# Patient Record
Sex: MALE | Race: WHITE | Employment: FULL TIME | URBAN - METROPOLITAN AREA
[De-identification: names, ages, dates, MRNs, and addresses within clinical notes are randomized per-mention and may not be internally consistent; named-entity substitution may affect disease eponyms.]

---

## 2017-01-16 PROBLEM — D50.9 MICROCYTIC ANEMIA: Status: ACTIVE | Noted: 2017-01-16

## 2017-02-08 ENCOUNTER — OFFICE VISIT (OUTPATIENT)
Dept: WOUND CARE | Facility: HOSPITAL | Age: 52
End: 2017-02-08
Attending: INTERNAL MEDICINE
Payer: COMMERCIAL

## 2017-02-08 DIAGNOSIS — L98.422 NON-PRESSURE CHRONIC ULCER OF BACK WITH FAT LAYER EXPOSED (HCC): Primary | ICD-10-CM

## 2017-02-08 DIAGNOSIS — C20 RECTAL CANCER (HCC): ICD-10-CM

## 2017-02-08 PROCEDURE — 99214 OFFICE O/P EST MOD 30 MIN: CPT

## 2017-02-08 PROCEDURE — 87205 SMEAR GRAM STAIN: CPT

## 2017-02-08 PROCEDURE — 87070 CULTURE OTHR SPECIMN AEROBIC: CPT

## 2017-02-08 PROCEDURE — 11042 DBRDMT SUBQ TIS 1ST 20SQCM/<: CPT

## 2017-02-09 NOTE — PROGRESS NOTES
Chief Complaint  This information was obtained from the patient  Patient here for an initial visit. Patient had surgery for APR with a flap on 10/7/2016 the incision has not healed completely.  Had surgery [address] [facility] Dr. Ry Biggs needed        Objective    Constitutional  Height/Length: 64 in (162.56 cm), Weight: 147 lbs (66.82 kgs), BMI: 25.2, Temperature: 97.2 °F (36.22 °C), Pulse: 92 bpm, Respiratory Rate: 16 breaths/min, Blood Pressure: 161/98 mmHg.      Integumentary (Hair, Ski 0.6 sq cm and a volume of 0.36 cubic cm;  General Notes:  Denies pain        Plan    Wound Orders:  Wound #1 Coccyx     Topicals:  Initial Anesthetic Order: Apply lidocaine to wound bed on all future wound center visits during preparation for physician exa

## 2017-02-15 ENCOUNTER — OFFICE VISIT (OUTPATIENT)
Dept: WOUND CARE | Facility: HOSPITAL | Age: 52
End: 2017-02-15
Attending: INTERNAL MEDICINE
Payer: COMMERCIAL

## 2017-02-15 DIAGNOSIS — L98.422 NON-PRESSURE CHRONIC ULCER OF BACK WITH FAT LAYER EXPOSED (HCC): Primary | ICD-10-CM

## 2017-02-15 DIAGNOSIS — C20 RECTAL CANCER (HCC): ICD-10-CM

## 2017-02-15 PROCEDURE — 11042 DBRDMT SUBQ TIS 1ST 20SQCM/<: CPT

## 2017-02-15 NOTE — PROGRESS NOTES
Chief Complaint  This information was obtained from the patient  here for follow up. doing well with dressing changes. Allergies  NKDA    HPI  This information was obtained from the patient  2/15/17: Doing ok - wound dimensions pretty much the same. 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

## 2017-02-20 PROBLEM — D69.6 THROMBOCYTOPENIA (HCC): Status: ACTIVE | Noted: 2017-02-20

## 2017-02-24 ENCOUNTER — OFFICE VISIT (OUTPATIENT)
Dept: OPHTHALMOLOGY | Facility: CLINIC | Age: 52
End: 2017-02-24

## 2017-02-24 DIAGNOSIS — H40.003 GLAUCOMA SUSPECT OF BOTH EYES: Primary | ICD-10-CM

## 2017-02-24 DIAGNOSIS — H52.13 MYOPIA WITH PRESBYOPIA OF BOTH EYES: ICD-10-CM

## 2017-02-24 DIAGNOSIS — H52.4 MYOPIA WITH PRESBYOPIA OF BOTH EYES: ICD-10-CM

## 2017-02-24 DIAGNOSIS — H01.00A BLEPHARITIS OF UPPER AND LOWER EYELIDS OF BOTH EYES, UNSPECIFIED TYPE: ICD-10-CM

## 2017-02-24 DIAGNOSIS — H01.00B BLEPHARITIS OF UPPER AND LOWER EYELIDS OF BOTH EYES, UNSPECIFIED TYPE: ICD-10-CM

## 2017-02-24 DIAGNOSIS — Z83.511 FAMILY HISTORY OF GLAUCOMA IN MATERNAL GRANDMOTHER: ICD-10-CM

## 2017-02-24 PROBLEM — H52.203 MYOPIA OF BOTH EYES WITH ASTIGMATISM AND PRESBYOPIA: Status: ACTIVE | Noted: 2017-02-24

## 2017-02-24 PROCEDURE — 92014 COMPRE OPH EXAM EST PT 1/>: CPT | Performed by: OPHTHALMOLOGY

## 2017-02-24 PROCEDURE — 92133 CPTRZD OPH DX IMG PST SGM ON: CPT | Performed by: OPHTHALMOLOGY

## 2017-02-24 PROCEDURE — 92015 DETERMINE REFRACTIVE STATE: CPT | Performed by: OPHTHALMOLOGY

## 2017-02-24 PROCEDURE — 92083 EXTENDED VISUAL FIELD XM: CPT | Performed by: OPHTHALMOLOGY

## 2017-02-24 RX ORDER — LATANOPROST 50 UG/ML
SOLUTION/ DROPS OPHTHALMIC
Qty: 1 BOTTLE | Refills: 11 | Status: SHIPPED | OUTPATIENT
Start: 2017-02-24 | End: 2017-11-21

## 2017-02-24 RX ORDER — ACETAMINOPHEN 325 MG/1
TABLET ORAL
COMMUNITY
End: 2017-11-21

## 2017-02-24 NOTE — PROGRESS NOTES
Kristyn Enriquez is a 46year old male. HPI:     HPI     EP. LDE: 319/15. 46year old male here for a complete eye exam and OCT. Patient states when he wakes in the morning, his eyes ar \"crusty\".  Patient also has a decrease in his near vision for the Prescriptions:  acetaminophen 325 MG Oral Tab Take by mouth.  Disp:  Rfl:    latanoprost 0.005 % Ophthalmic Solution Instill 1 drop by ophthalmic route every night into both eyes Disp: 1 Bottle Rfl: 11   hydrocodone-acetaminophen 7.5-325 MG Oral Tab Take 1- Macula Normal Normal    Vessels Normal Normal    Periphery Normal Normal            Refraction     Wearing Rx      Sphere Cylinder Axis   Right -6.50 +0.00 000   Left -6.25 +0.00 000       Type:  Single vision      Manifest Refraction      Sphere Cylind

## 2017-02-24 NOTE — PATIENT INSTRUCTIONS
Family history of glaucoma in maternal grandmother  Patient's maternal grandmother lost her sight due to glaucoma.          Glaucoma suspect of both eyes  Patient is a glaucoma suspect due to increase cup to disc ratio in both eyes (0.6 in both eyes) and fa condition.   Causes  Causes of blepharitis may include:  · Problems with the oil glands in the eyelid (meibomian glands)  · Dandruff of the scalp and eyebrows (seborrheic dermatitis)  · Acne rosacea (a skin condition that causes redness of the face, and oth advised. Your healthcare provider may refer you to an eye specialist (an optometrist or ophthalmologist) for further evaluation and treatment.   When to seek medical advice  Call your healthcare provider right away if any of these occur:  · Increase in redn is called a stye. Left untreated, it may become a chronic cyst. You may need surgery to remove it. Date Last Reviewed: 6/4/2015  © 6947-3542 The [address] [place] [address], [address] [place] [address]. All rights reserved.  This information is not optic nerve sends messages to the brain so you can see. There are two main kinds of glaucoma: \"open-angle\" and \"closed-angle. \"  Drainage area  The eye is always producing fluid. The eye's drainage areas may become clogged or blocked.  Too much fluid sta directed. Don't stop taking them—even if you have no symptoms. If you do, eye pressure can rise rapidly and damage your vision. If the medicines cause side effects, talk to your doctor.   Procedures to improve eye drainage  In some cases of glaucoma, other ophthalmologist (a medical doctor who specializes in eye care) is very important to follow your response to the medicines. Home care  · Take prescribed medicines exactly as directed.   · The eye needs certain vitamins and minerals for good health—especiall instructions.

## 2017-02-24 NOTE — ASSESSMENT & PLAN NOTE
Patient is a glaucoma suspect due to increase cup to disc ratio in both eyes (0.6 in both eyes) and family history of glaucoma in the maternal grandmother.   OCT completed today with results of mild thinning in each eye, which is slightly worse from last OC

## 2017-03-21 PROBLEM — Z00.00 HEALTHCARE MAINTENANCE: Status: ACTIVE | Noted: 2017-03-21

## 2017-03-21 PROCEDURE — 82378 CARCINOEMBRYONIC ANTIGEN: CPT | Performed by: PHYSICIAN ASSISTANT

## 2017-03-23 ENCOUNTER — TELEPHONE (OUTPATIENT)
Dept: OPHTHALMOLOGY | Facility: CLINIC | Age: 52
End: 2017-03-23

## 2017-03-23 NOTE — TELEPHONE ENCOUNTER
Pt. Is requesting to get a copy of his Rx for his eye glasses faxed to [facility] # [phone] and their phone # [phone].

## 2017-08-21 PROCEDURE — 82378 CARCINOEMBRYONIC ANTIGEN: CPT | Performed by: PHYSICIAN ASSISTANT

## 2017-11-16 PROCEDURE — 82378 CARCINOEMBRYONIC ANTIGEN: CPT | Performed by: PHYSICIAN ASSISTANT

## 2018-02-20 PROCEDURE — 82378 CARCINOEMBRYONIC ANTIGEN: CPT | Performed by: PHYSICIAN ASSISTANT

## 2018-02-28 PROCEDURE — 82607 VITAMIN B-12: CPT | Performed by: INTERNAL MEDICINE

## 2020-01-23 PROBLEM — H52.13 MYOPIA WITH PRESBYOPIA OF BOTH EYES: Status: RESOLVED | Noted: 2017-02-24 | Resolved: 2020-01-23

## 2020-01-23 PROBLEM — Z83.511 FAMILY HISTORY OF GLAUCOMA IN MATERNAL GRANDMOTHER: Status: RESOLVED | Noted: 2017-02-24 | Resolved: 2020-01-23

## 2020-01-23 PROBLEM — Z00.00 HEALTHCARE MAINTENANCE: Status: RESOLVED | Noted: 2017-03-21 | Resolved: 2020-01-23

## 2020-01-23 PROBLEM — D50.9 MICROCYTIC ANEMIA: Status: RESOLVED | Noted: 2017-01-16 | Resolved: 2020-01-23

## 2020-01-23 PROBLEM — H52.4 MYOPIA WITH PRESBYOPIA OF BOTH EYES: Status: RESOLVED | Noted: 2017-02-24 | Resolved: 2020-01-23

## (undated) NOTE — MR AVS SNAPSHOT
[facility] [address] [address]  [phone]               Thank you for choosing us for your health care visit with [facility].   We are glad to serve you and happy to provide you with this summary of yo dexamethasone 4 MG tablet   Take 2 tabs with food in AM for 2 days following chemo, then as directed. Commonly known as:  DECADRON           hydrocodone-acetaminophen 7.5-325 MG Tabs   Take 1-2 tablets by mouth every 6 (six) hours as needed for Pain.

## (undated) NOTE — MR AVS SNAPSHOT
Michelle  [address]  [phone]               Thank you for choosing us for your health care visit with [facility].  Gilda Strong MD.  We are glad to serve you and happy to provide you with this arias Myopia with presbyopia of both eyes    Blepharitis of upper and lower eyelids of both eyes, unspecified type          Instructions and Information about Your Health    Family history of glaucoma in maternal grandmother  Patient's maternal grandmother lost Blepharitis is a chronic condition and difficult to cure. Even with successful treatment, recurrences are common. Good hygiene and home treatments (in the Home care section below) can improve your condition.   Causes  Causes of blepharitis may include:  · P treatment for the condition. · Wash your hands regularly to help prevent dirt and bacteria from coming in contact with your eyelid. Follow-up care  Follow up with your healthcare provider, or as advised.  Your healthcare provider may refer you to an eye s Flakes or crusts can form during the night. If this happens, it may be hard for you to open your eyes in the morning. If blepharitis is not treated promptly, it can lead to an infection at the base of an eyelash or oil gland.  This infection is called a sty Glaucoma is an eye disease that can cause blindness. If caught early, it can usually be controlled. But it often has no symptoms, so you need regular eye exams. Glaucoma usually begins when pressure builds up in the eye.  This pressure can damage the optic suggest what treatment is best for you. You may just need more frequent exams. Medicines and procedures may also help. Medicines to lower eye pressure  Eyedrops and pills may be used to lower eye pressure.  Some medicines reduce the amount of fluid your Open-angle glaucoma can be treated with eye drops, surgery, and sometimes pills. These help to lower the pressure in the eye. Treatment is usually successful at keeping pressures low and preventing further vision loss.  However, the condition cannot be cure · Eye pain or redness  · Severe headache  · Emerson halos around lights  · Sudden loss of vision  Date Last Reviewed: 6/22/2015  © 2901-1148 The [address] [place] [address], [address] [place] [address]. All rights reserved.  This information is not Support Staff. Remember, [employer] is NOT to be used for urgent needs. For medical emergencies, dial 911. Visit [email or web address] to learn more.            Visit [facility] online at  [email or web address]